# Patient Record
Sex: MALE | Race: WHITE | NOT HISPANIC OR LATINO | Employment: FULL TIME | URBAN - METROPOLITAN AREA
[De-identification: names, ages, dates, MRNs, and addresses within clinical notes are randomized per-mention and may not be internally consistent; named-entity substitution may affect disease eponyms.]

---

## 2018-07-03 ENCOUNTER — OFFICE VISIT (OUTPATIENT)
Dept: URGENT CARE | Facility: CLINIC | Age: 28
End: 2018-07-03
Payer: COMMERCIAL

## 2018-07-03 ENCOUNTER — APPOINTMENT (OUTPATIENT)
Dept: RADIOLOGY | Facility: IMAGING CENTER | Age: 28
End: 2018-07-03
Attending: NURSE PRACTITIONER
Payer: COMMERCIAL

## 2018-07-03 VITALS
OXYGEN SATURATION: 99 % | SYSTOLIC BLOOD PRESSURE: 130 MMHG | HEART RATE: 48 BPM | TEMPERATURE: 98.2 F | RESPIRATION RATE: 12 BRPM | DIASTOLIC BLOOD PRESSURE: 74 MMHG | HEIGHT: 69 IN | WEIGHT: 159 LBS | BODY MASS INDEX: 23.55 KG/M2

## 2018-07-03 DIAGNOSIS — M25.572 ACUTE LEFT ANKLE PAIN: ICD-10-CM

## 2018-07-03 DIAGNOSIS — S93.492A SPRAIN OF ANTERIOR TALOFIBULAR LIGAMENT OF LEFT ANKLE, INITIAL ENCOUNTER: ICD-10-CM

## 2018-07-03 PROCEDURE — 99213 OFFICE O/P EST LOW 20 MIN: CPT | Performed by: NURSE PRACTITIONER

## 2018-07-03 PROCEDURE — 73610 X-RAY EXAM OF ANKLE: CPT | Mod: 26,LT | Performed by: NURSE PRACTITIONER

## 2018-07-03 ASSESSMENT — PATIENT HEALTH QUESTIONNAIRE - PHQ9: CLINICAL INTERPRETATION OF PHQ2 SCORE: 0

## 2018-07-03 NOTE — PROGRESS NOTES
"Subjective:      Yared Norwood is a 28 y.o. male who presents with Ankle Pain (left ankle)            This is a new problem. Pt reports he sprained his left ankle playing basketball 3 days ago. He admits it was too painful to bear weight the first 2 days so he used crutches. The amount of swelling has remained relatively the same. He did ice and elevate it several times per day. He presents today wanting to make sure his ankle is not broken and he can try to get back to exercising. He has not taken any medications for the pain. He admits his ankle feels a little better today even without the crutches.        Review of Systems   Musculoskeletal: Positive for joint pain (left ankle).   All other systems reviewed and are negative.    Past Medical History:   Diagnosis Date   • ASTHMA       Past Surgical History:   Procedure Laterality Date   • WRIST ORIF  2005    L wrist      Social History     Social History   • Marital status: Single     Spouse name: N/A   • Number of children: N/A   • Years of education: N/A     Occupational History   • Not on file.     Social History Main Topics   • Smoking status: Current Some Day Smoker     Types: Cigarettes   • Smokeless tobacco: Never Used   • Alcohol use Yes      Comment: occasional   • Drug use: No   • Sexual activity: Not on file     Other Topics Concern   • Not on file     Social History Narrative   • No narrative on file          Objective:     /74   Pulse (!) 48   Temp 36.8 °C (98.2 °F)   Resp 12   Ht 1.753 m (5' 9\")   Wt 72.1 kg (159 lb)   SpO2 99%   BMI 23.48 kg/m²      Physical Exam   Constitutional: He is oriented to person, place, and time. Vital signs are normal. He appears well-developed and well-nourished.   HENT:   Head: Normocephalic and atraumatic.   Eyes: EOM are normal. Pupils are equal, round, and reactive to light.   Neck: Normal range of motion.   Cardiovascular: Normal rate and regular rhythm.    Pulmonary/Chest: Effort normal. "   Musculoskeletal: Normal range of motion.        Left ankle: He exhibits swelling. He exhibits no deformity. Tenderness. AITFL tenderness found. Achilles tendon normal.   Neurological: He is alert and oriented to person, place, and time.   Skin: Skin is warm and dry. Capillary refill takes less than 2 seconds.   Psychiatric: He has a normal mood and affect. His speech is normal and behavior is normal. Thought content normal.   Vitals reviewed.         Xray: no fracture or dislocation by my read. Radiology review pending.  7/3/2018 2:24 PM    HISTORY/REASON FOR EXAM:  Pain/Deformity Following Trauma  Pain and swelling    TECHNIQUE/EXAM DESCRIPTION AND NUMBER OF VIEWS:  3 views of the LEFT ankle.    COMPARISON: None    FINDINGS:  There is no evidence of fracture or dislocation.  The ankle mortise is well-maintained. The talar dome is preserved.  Mild soft tissue swelling is seen about the lateral ankle.     Impression       No evidence of fracture or dislocation.    Mild soft tissue swelling.          Assessment/Plan:     1. Acute left ankle pain  - DX-ANKLE 3+ VIEWS LEFT; Future    2. Sprain of anterior talofibular ligament of left ankle, initial encounter    There is no evidence for a fracture  Continue to use crutches as needed   WBAT   Activity as tolerated  Alternate tylenol and ibuprofen as needed for any pain  Supportive care, differential diagnoses, and indications for immediate follow-up discussed with patient.    Pathogenesis of diagnosis discussed including typical length and natural progression.      Instructed to return to  or nearest emergency department if symptoms fail to improve, for any change in condition, further concerns, or new concerning symptoms.  Patient states understanding of the plan of care and discharge instructions.

## 2018-07-28 ENCOUNTER — OFFICE VISIT (OUTPATIENT)
Dept: URGENT CARE | Facility: CLINIC | Age: 28
End: 2018-07-28
Payer: COMMERCIAL

## 2018-07-28 VITALS
OXYGEN SATURATION: 98 % | BODY MASS INDEX: 22.19 KG/M2 | RESPIRATION RATE: 14 BRPM | TEMPERATURE: 97.4 F | SYSTOLIC BLOOD PRESSURE: 124 MMHG | DIASTOLIC BLOOD PRESSURE: 82 MMHG | WEIGHT: 155 LBS | HEIGHT: 70 IN | HEART RATE: 56 BPM

## 2018-07-28 DIAGNOSIS — J06.9 VIRAL URI WITH COUGH: ICD-10-CM

## 2018-07-28 DIAGNOSIS — J02.9 PHARYNGITIS, UNSPECIFIED ETIOLOGY: ICD-10-CM

## 2018-07-28 LAB
INT CON NEG: NEGATIVE
INT CON POS: POSITIVE
S PYO AG THROAT QL: NEGATIVE

## 2018-07-28 PROCEDURE — 99213 OFFICE O/P EST LOW 20 MIN: CPT | Performed by: NURSE PRACTITIONER

## 2018-07-28 PROCEDURE — 87880 STREP A ASSAY W/OPTIC: CPT | Performed by: NURSE PRACTITIONER

## 2018-07-28 ASSESSMENT — ENCOUNTER SYMPTOMS
SINUS PAIN: 0
NECK PAIN: 0
DIZZINESS: 0
SHORTNESS OF BREATH: 0
TROUBLE SWALLOWING: 0
EYE PAIN: 0
FEVER: 0
VOMITING: 0
SORE THROAT: 1
HEADACHES: 0
COUGH: 1
CHILLS: 0
WHEEZING: 0
NAUSEA: 0
MYALGIAS: 0

## 2018-07-28 NOTE — PROGRESS NOTES
Subjective:     Yared Norwood is a 28 y.o. male who presents for Sore Throat (x1wk fever, sore throat, flem)       Pharyngitis    This is a new problem. The current episode started in the past 7 days. The problem has been unchanged. Neither side of throat is experiencing more pain than the other. There has been no fever. The pain is at a severity of 2/10. The pain is mild. Associated symptoms include congestion and coughing. Pertinent negatives include no ear discharge, ear pain, headaches, neck pain, shortness of breath, trouble swallowing or vomiting. He has had no exposure to strep. He has tried acetaminophen and gargles for the symptoms. The treatment provided no relief.   URI    This is a new problem. The current episode started in the past 7 days. The problem has been rapidly improving. The maximum temperature recorded prior to his arrival was 100.4 - 100.9 F. The fever has been present for 1 to 2 days. Associated symptoms include congestion, coughing and a sore throat. Pertinent negatives include no chest pain, ear pain, headaches, nausea, neck pain, rash, sinus pain, sneezing, vomiting or wheezing. He has tried acetaminophen for the symptoms. The treatment provided no relief.     Past Medical History:   Diagnosis Date   • ASTHMA      Past Surgical History:   Procedure Laterality Date   • WRIST ORIF  2005    L wrist     Social History     Social History   • Marital status: Single     Spouse name: N/A   • Number of children: N/A   • Years of education: N/A     Occupational History   • Not on file.     Social History Main Topics   • Smoking status: Current Some Day Smoker     Types: Cigarettes   • Smokeless tobacco: Never Used   • Alcohol use Yes      Comment: occasional   • Drug use: No   • Sexual activity: Not on file     Other Topics Concern   • Not on file     Social History Narrative   • No narrative on file    History reviewed. No pertinent family history. Review of Systems   Constitutional: Negative  "for chills and fever.   HENT: Positive for congestion and sore throat. Negative for ear discharge, ear pain, sinus pain, sneezing and trouble swallowing.    Eyes: Negative for pain.   Respiratory: Positive for cough. Negative for shortness of breath and wheezing.    Cardiovascular: Negative for chest pain.   Gastrointestinal: Negative for nausea and vomiting.   Genitourinary: Negative for hematuria.   Musculoskeletal: Negative for myalgias and neck pain.   Skin: Negative for rash.   Neurological: Negative for dizziness and headaches.     Allergies   Allergen Reactions   • Nkda [No Known Drug Allergy]       Objective:   /82   Pulse (!) 56   Temp 36.3 °C (97.4 °F)   Resp 14   Ht 1.778 m (5' 10\")   Wt 70.3 kg (155 lb)   SpO2 98%   BMI 22.24 kg/m²   Physical Exam   Constitutional: He is oriented to person, place, and time. He appears well-developed and well-nourished. No distress.   HENT:   Head: Normocephalic and atraumatic.   Right Ear: Tympanic membrane normal.   Left Ear: Tympanic membrane normal.   Nose: Nose normal. Right sinus exhibits no maxillary sinus tenderness and no frontal sinus tenderness. Left sinus exhibits no maxillary sinus tenderness and no frontal sinus tenderness.   Mouth/Throat: Uvula is midline and mucous membranes are normal. Posterior oropharyngeal edema and posterior oropharyngeal erythema present. No tonsillar abscesses. No tonsillar exudate.   Eyes: Pupils are equal, round, and reactive to light. Conjunctivae and EOM are normal. Right eye exhibits no discharge. Left eye exhibits no discharge.   Cardiovascular: Normal rate and regular rhythm.    No murmur heard.  Pulmonary/Chest: Effort normal and breath sounds normal. No respiratory distress.   Abdominal: Soft. He exhibits no distension. There is no tenderness.   Neurological: He is alert and oriented to person, place, and time. He has normal reflexes. No sensory deficit.   Skin: Skin is warm, dry and intact.   Psychiatric: He " has a normal mood and affect.         Assessment/Plan:   Assessment    1. Pharyngitis, unspecified etiology  2. Viral URI with cough  - POCT Rapid Strep A     Strep negative    It was explained to the pt. Today that due to the viral nature of the pt's illness, we will treat symptomatically today. Encouraged OTC supportive meds PRN. Humidification, increase fluids, avoid night time dairy.   Patient given precautionary s/sx that mandate immediate follow up and evaluation in the ED. Advised of risks of not doing so.    DDX, Supportive care, and indications for immediate follow-up discussed with patient.    Instructed to return to clinic or nearest emergency department if we are not available for any change in condition, further concerns, or worsening of symptoms.    The patient demonstrated a good understanding and agreed with the treatment plan.